# Patient Record
Sex: MALE | Race: BLACK OR AFRICAN AMERICAN | NOT HISPANIC OR LATINO | URBAN - METROPOLITAN AREA
[De-identification: names, ages, dates, MRNs, and addresses within clinical notes are randomized per-mention and may not be internally consistent; named-entity substitution may affect disease eponyms.]

---

## 2024-08-16 ENCOUNTER — LAB (OUTPATIENT)
Dept: LAB | Facility: LAB | Age: 39
End: 2024-08-16

## 2024-08-16 LAB — COTININE UR QL SCN: NEGATIVE

## 2025-04-30 ENCOUNTER — APPOINTMENT (OUTPATIENT)
Dept: DERMATOLOGY | Facility: CLINIC | Age: 40
End: 2025-04-30

## 2025-05-08 ENCOUNTER — APPOINTMENT (OUTPATIENT)
Dept: DERMATOLOGY | Facility: CLINIC | Age: 40
End: 2025-05-08
Payer: COMMERCIAL

## 2025-05-08 DIAGNOSIS — B35.4 TINEA CORPORIS: Primary | ICD-10-CM

## 2025-05-08 LAB — POC KOH - DERM RESULT 1: ABNORMAL

## 2025-05-08 PROCEDURE — 87220 TISSUE EXAM FOR FUNGI: CPT | Performed by: STUDENT IN AN ORGANIZED HEALTH CARE EDUCATION/TRAINING PROGRAM

## 2025-05-08 PROCEDURE — 99204 OFFICE O/P NEW MOD 45 MIN: CPT | Performed by: STUDENT IN AN ORGANIZED HEALTH CARE EDUCATION/TRAINING PROGRAM

## 2025-05-08 RX ORDER — KETOCONAZOLE 20 MG/G
CREAM TOPICAL 2 TIMES DAILY
Qty: 30 G | Refills: 11 | Status: SHIPPED | OUTPATIENT
Start: 2025-05-08

## 2025-05-08 NOTE — PROGRESS NOTES
Subjective     Anuja Torrez is a 39 y.o. male who presents for the following: Pigment Change (Hyperpigmented scaly patch to mid chest, began about 1 year ago. Using OTC moisturizers. ).          Review of Systems:  No other skin or systemic complaints other than what is documented elsewhere in the note.    The following portions of the chart were reviewed this encounter and updated as appropriate:          Skin Cancer History  Biopsy Log Book  No skin cancers from Specimen Tracking.    Additional History      Specialty Problems    None       Objective   Well appearing patient in no apparent distress; mood and affect are within normal limits.    A focused skin examination was performed. All findings within normal limits unless otherwise noted below.    Assessment/Plan   Skin Exam  1. TINEA CORPORIS  Chest - Medial (Center)  Central chest with annular pink scaly patch  The fungal nature of this skin condition was discussed with the patient. Advised this is common in areas with increased moisture and occlusion.     Patient to apply ketoconazole 2% cream 2x daily for 1 month. Condition often recurs, repeat treatment courses as needed.     Call if worsening or fails to improve.    ketoconazole (NIZOral) 2 % cream - Chest - Medial (Center)  Apply topically 2 times a day. To rash on chest    POCT KOH Prep - DERM Manually Resulted - Chest - Medial (Center)

## 2025-05-08 NOTE — Clinical Note
The fungal nature of this skin condition was discussed with the patient. Advised this is common in areas with increased moisture and occlusion.     Patient to apply ketoconazole 2% cream 2x daily for 1 month. Condition often recurs, repeat treatment courses as needed.     Call if worsening or fails to improve.

## 2025-06-12 ENCOUNTER — APPOINTMENT (OUTPATIENT)
Dept: DERMATOLOGY | Facility: CLINIC | Age: 40
End: 2025-06-12
Payer: COMMERCIAL